# Patient Record
Sex: MALE | Race: WHITE | NOT HISPANIC OR LATINO | Employment: FULL TIME | URBAN - METROPOLITAN AREA
[De-identification: names, ages, dates, MRNs, and addresses within clinical notes are randomized per-mention and may not be internally consistent; named-entity substitution may affect disease eponyms.]

---

## 2021-09-27 ENCOUNTER — OFFICE VISIT (OUTPATIENT)
Dept: INTERNAL MEDICINE | Facility: CLINIC | Age: 26
End: 2021-09-27

## 2021-09-27 VITALS
WEIGHT: 183 LBS | HEIGHT: 71 IN | SYSTOLIC BLOOD PRESSURE: 124 MMHG | TEMPERATURE: 98.2 F | BODY MASS INDEX: 25.62 KG/M2 | HEART RATE: 78 BPM | DIASTOLIC BLOOD PRESSURE: 72 MMHG | OXYGEN SATURATION: 99 % | RESPIRATION RATE: 16 BRPM

## 2021-09-27 DIAGNOSIS — Z20.2 EXPOSURE TO STD: Primary | ICD-10-CM

## 2021-09-27 PROCEDURE — 87591 N.GONORRHOEAE DNA AMP PROB: CPT | Performed by: NURSE PRACTITIONER

## 2021-09-27 PROCEDURE — 87661 TRICHOMONAS VAGINALIS AMPLIF: CPT | Performed by: NURSE PRACTITIONER

## 2021-09-27 PROCEDURE — 87491 CHLMYD TRACH DNA AMP PROBE: CPT | Performed by: NURSE PRACTITIONER

## 2021-09-27 PROCEDURE — 99204 OFFICE O/P NEW MOD 45 MIN: CPT | Performed by: NURSE PRACTITIONER

## 2021-09-28 ENCOUNTER — TELEPHONE (OUTPATIENT)
Dept: INTERNAL MEDICINE | Facility: CLINIC | Age: 26
End: 2021-09-28

## 2021-09-28 NOTE — TELEPHONE ENCOUNTER
Apologies that our computer is preset and will not allow me to change directions on Monistat.  It is a topical cream to the penis twice daily for 1 week

## 2021-09-28 NOTE — TELEPHONE ENCOUNTER
Pharmacy Name:  [facility]    Pharmacy representative name: DAVID     Pharmacy representative phone number: [phone]    What medication are you calling in regards to: NISTATIN VAGINAL CREAM    What question does the pharmacy have: CLARIFICATION FOR TOPICAL CREAM OR VAGINAL CREAM.    Who is the provider that prescribed the medication: SHANEKA

## 2021-09-30 ENCOUNTER — TELEPHONE (OUTPATIENT)
Dept: INTERNAL MEDICINE | Facility: CLINIC | Age: 26
End: 2021-09-30

## 2021-09-30 LAB
C TRACH RRNA SPEC QL NAA+PROBE: NEGATIVE
N GONORRHOEA RRNA SPEC QL NAA+PROBE: NEGATIVE
T VAGINALIS DNA SPEC QL NAA+PROBE: NEGATIVE

## 2021-09-30 NOTE — TELEPHONE ENCOUNTER
Reached, spoke to Evelyn - pharmacist at [facility]. Advised of clinical message from HEMA Perez.   Apologies that our computer is preset and will not allow me to change directions on Monistat.  It is a topical cream to the penis twice daily for 1 week         Documentation      Evelyn verbalized good understanding, stated she will get that changed.

## 2021-10-04 ENCOUNTER — TELEMEDICINE (OUTPATIENT)
Dept: INTERNAL MEDICINE | Facility: CLINIC | Age: 26
End: 2021-10-04

## 2021-10-04 VITALS
TEMPERATURE: 98 F | WEIGHT: 181 LBS | RESPIRATION RATE: 20 BRPM | BODY MASS INDEX: 25.24 KG/M2 | SYSTOLIC BLOOD PRESSURE: 130 MMHG | HEART RATE: 76 BPM | DIASTOLIC BLOOD PRESSURE: 74 MMHG

## 2021-10-04 DIAGNOSIS — J02.9 ACUTE PHARYNGITIS, UNSPECIFIED ETIOLOGY: Primary | ICD-10-CM

## 2021-10-04 DIAGNOSIS — J06.9 UPPER RESPIRATORY TRACT INFECTION, UNSPECIFIED TYPE: ICD-10-CM

## 2021-10-04 PROCEDURE — 99213 OFFICE O/P EST LOW 20 MIN: CPT | Performed by: INTERNAL MEDICINE

## 2021-10-04 RX ORDER — HYDROCODONE BITARTRATE AND ACETAMINOPHEN 7.5; 325 MG/1; MG/1
TABLET ORAL
COMMUNITY
Start: 2021-09-30

## 2021-10-04 NOTE — PROGRESS NOTES
Subjective       Christiano Eubanks is a 26 y.o. male.     Chief Complaint   Patient presents with   • Cough     stared as sore throat fever 4 days  ago   • Diarrhea       History obtained from chart review and the patient.    The patient was seen at [facility] on 9/30/2021.  Records have been received and reviewed.  He had a URI.  O2 sat was found to be 98%.  Group A Strep PCR, Influenza swab, and COVID-19 swabs were all negative.  He was diagnosed with Tonsillitis and put on a Z-Nathaniel and Prednisone 50 mg daily for 5 days.    Cough  This is a new problem. Episode onset: 5-6 days ago. The problem has been gradually improving. The cough is productive of purulent sputum. Associated symptoms include chills, a fever, headaches (now resolved ), myalgias (now resolved ), nasal congestion and a sore throat (now resolved ). Pertinent negatives include no chest pain, ear pain, eye redness, hemoptysis, postnasal drip, rash, rhinorrhea, shortness of breath or wheezing. Nothing aggravates the symptoms. Risk factors: None. Treatments tried: otc cough syrup. The treatment provided moderate relief. There is no history of asthma or environmental allergies.   Diarrhea   This is a new problem. Episode onset: 4-5 days ago. The problem has been resolved. The stool consistency is described as watery. Associated symptoms include arthralgias, chills, coughing, a fever, headaches (now resolved ) and myalgias (now resolved ). Pertinent negatives include no abdominal pain or vomiting. There are no known risk factors. He has tried nothing for the symptoms. There is no history of inflammatory bowel disease, irritable bowel syndrome or a recent abdominal surgery.   Fever   This is a new problem. Episode onset: 5-6 days ago. The problem occurs intermittently. Progression since onset: none x 24 hours. Maximum temperature: up to 101. The temperature was taken using an oral thermometer. Associated symptoms include congestion, coughing,  diarrhea (now resolved ), headaches (now resolved ), muscle aches (now resolved ) and a sore throat (now resolved ). Pertinent negatives include no abdominal pain, chest pain, ear pain, nausea, rash, vomiting or wheezing. He has tried acetaminophen and NSAIDs for the symptoms. The treatment provided significant relief.   Risk factors: no contaminated food, no contaminated water, no recent travel and no sick contacts         The following portions of the patient's history were reviewed and updated as appropriate: allergies, current medications, past family history, past medical history, past social history, past surgical history and problem list.      Review of Systems   Constitutional: Positive for appetite change (improving), chills and fever. Negative for fatigue.   HENT: Positive for congestion and sore throat (now resolved ). Negative for ear discharge, ear pain, postnasal drip, rhinorrhea, sinus pressure, sinus pain, sneezing and voice change.    Eyes: Negative for pain, discharge, redness and itching.   Respiratory: Positive for cough and chest tightness (now resolved ). Negative for hemoptysis, shortness of breath and wheezing.    Cardiovascular: Negative for chest pain.   Gastrointestinal: Positive for diarrhea (now resolved ). Negative for abdominal pain, nausea and vomiting.   Musculoskeletal: Positive for arthralgias and myalgias (now resolved ).   Skin: Negative for rash.   Allergic/Immunologic: Negative for environmental allergies.   Neurological: Positive for headaches (now resolved ).   Hematological: Positive for adenopathy (improved).           Objective     Blood pressure 130/74, pulse 76, temperature 98 °F (36.7 °C), temperature source Temporal, resp. rate 20, weight 82.1 kg (181 lb).    Physical Exam  Vitals and nursing note reviewed.   Constitutional:       Appearance: He is well-developed and normal weight.   HENT:      Head: Normocephalic and atraumatic.      Comments: No maxillary or frontal  sinus tenderness to palpation.     Right Ear: Tympanic membrane, ear canal and external ear normal.      Left Ear: Tympanic membrane, ear canal and external ear normal.      Mouth/Throat:      Mouth: Mucous membranes are moist. No oral lesions.      Pharynx: Posterior oropharyngeal erythema (moderate) present. No oropharyngeal exudate.      Tonsils: No tonsillar exudate (moderate erythema +). 2+ on the right. 2+ on the left.   Eyes:      Conjunctiva/sclera: Conjunctivae normal.   Cardiovascular:      Rate and Rhythm: Normal rate and regular rhythm.      Heart sounds: No murmur heard.     Pulmonary:      Effort: Pulmonary effort is normal.      Breath sounds: Normal breath sounds.   Musculoskeletal:      Cervical back: Normal range of motion and neck supple.   Lymphadenopathy:      Cervical: No cervical adenopathy.   Skin:     Findings: No rash.   Neurological:      Mental Status: He is alert.   Psychiatric:         Mood and Affect: Mood normal.           Assessment/Plan   Diagnoses and all orders for this visit:    1. Acute pharyngitis, unspecified etiology (Primary)   Continue current medication(s) as noted in the history of present illness.    2. Upper respiratory tract infection, unspecified type   Recommended adding  Mucinex.      Return if symptoms worsen or fail to improve.

## 2021-10-04 NOTE — TELEPHONE ENCOUNTER
2ND ATTEMPT TO REACH PT     LEFT VOICE MAIL MESSAGE FOR PT TO RETURN CALL FOR MESSAGE FROM PCP. OFFICE NUMBER GIVEN.     HUB OKAY TO RELAY MESSAGE     Jeanette Smart, HEMA  P [facility] [place]  Please let pt know his test are negative.

## 2021-10-05 NOTE — TELEPHONE ENCOUNTER
3RD ATTEMPT TO REACH PATIENT    LEFT VOICE MAIL MESSAGE FOR PT TO RETURN CALL FOR MESSAGE FROM PCP. OFFICE NUMBER GIVEN.     HUB OKAY TO RELAY MESSAGE     Jeanette Smart, HEMA  P [facility]  Please let pt know his test are negative.     Please send letter.